# Patient Record
Sex: FEMALE | Race: OTHER | HISPANIC OR LATINO
[De-identification: names, ages, dates, MRNs, and addresses within clinical notes are randomized per-mention and may not be internally consistent; named-entity substitution may affect disease eponyms.]

---

## 2017-05-10 ENCOUNTER — TRANSCRIPTION ENCOUNTER (OUTPATIENT)
Age: 72
End: 2017-05-10

## 2017-05-18 ENCOUNTER — TRANSCRIPTION ENCOUNTER (OUTPATIENT)
Age: 72
End: 2017-05-18

## 2018-08-14 ENCOUNTER — TRANSCRIPTION ENCOUNTER (OUTPATIENT)
Age: 73
End: 2018-08-14

## 2019-01-20 ENCOUNTER — TRANSCRIPTION ENCOUNTER (OUTPATIENT)
Age: 74
End: 2019-01-20

## 2022-11-04 ENCOUNTER — EMERGENCY (EMERGENCY)
Facility: HOSPITAL | Age: 77
LOS: 0 days | Discharge: HOME | End: 2022-11-04
Attending: EMERGENCY MEDICINE
Payer: MEDICAID

## 2022-11-04 VITALS
SYSTOLIC BLOOD PRESSURE: 155 MMHG | OXYGEN SATURATION: 97 % | TEMPERATURE: 98 F | HEART RATE: 71 BPM | RESPIRATION RATE: 16 BRPM | DIASTOLIC BLOOD PRESSURE: 79 MMHG

## 2022-11-04 VITALS — HEIGHT: 60 IN | WEIGHT: 145.95 LBS

## 2022-11-04 DIAGNOSIS — S32.029A UNSPECIFIED FRACTURE OF SECOND LUMBAR VERTEBRA, INITIAL ENCOUNTER FOR CLOSED FRACTURE: ICD-10-CM

## 2022-11-04 DIAGNOSIS — I10 ESSENTIAL (PRIMARY) HYPERTENSION: ICD-10-CM
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
## 2022-11-04 DIAGNOSIS — E03.9 HYPOTHYROIDISM, UNSPECIFIED: ICD-10-CM

## 2022-11-04 DIAGNOSIS — M51.26 OTHER INTERVERTEBRAL DISC DISPLACEMENT, LUMBAR REGION: ICD-10-CM

## 2022-11-04 DIAGNOSIS — E78.5 HYPERLIPIDEMIA, UNSPECIFIED: ICD-10-CM

## 2022-11-04 DIAGNOSIS — R26.2 DIFFICULTY IN WALKING, NOT ELSEWHERE CLASSIFIED: ICD-10-CM
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
## 2022-11-04 DIAGNOSIS — M54.50 LOW BACK PAIN, UNSPECIFIED: ICD-10-CM

## 2022-11-04 LAB
APPEARANCE UR: CLEAR — SIGNIFICANT CHANGE UP
BASOPHILS # BLD AUTO: 0.07 K/UL — SIGNIFICANT CHANGE UP (ref 0–0.2)
BASOPHILS NFR BLD AUTO: 1.3 % — SIGNIFICANT CHANGE UP (ref 0–2)
BILIRUB UR-MCNC: NEGATIVE — SIGNIFICANT CHANGE UP
COLOR SPEC: YELLOW — SIGNIFICANT CHANGE UP
DIFF PNL FLD: NEGATIVE — SIGNIFICANT CHANGE UP
EOSINOPHIL # BLD AUTO: 0.15 K/UL — SIGNIFICANT CHANGE UP (ref 0–0.5)
EOSINOPHIL NFR BLD AUTO: 2.8 % — SIGNIFICANT CHANGE UP (ref 0–6)
GLUCOSE UR QL: NEGATIVE — SIGNIFICANT CHANGE UP
HCT VFR BLD CALC: 42.8 % — SIGNIFICANT CHANGE UP (ref 34.5–45)
HGB BLD-MCNC: 13.8 G/DL — SIGNIFICANT CHANGE UP (ref 11.5–15.5)
IMM GRANULOCYTES NFR BLD AUTO: 0.2 % — SIGNIFICANT CHANGE UP (ref 0–0.9)
KETONES UR-MCNC: NEGATIVE — SIGNIFICANT CHANGE UP
LEUKOCYTE ESTERASE UR-ACNC: ABNORMAL
LYMPHOCYTES # BLD AUTO: 1.46 K/UL — SIGNIFICANT CHANGE UP (ref 1–3.3)
LYMPHOCYTES # BLD AUTO: 27.2 % — SIGNIFICANT CHANGE UP (ref 13–44)
MCHC RBC-ENTMCNC: 28.5 PG — SIGNIFICANT CHANGE UP (ref 27–34)
MCHC RBC-ENTMCNC: 32.2 GM/DL — SIGNIFICANT CHANGE UP (ref 32–36)
MCV RBC AUTO: 88.4 FL — SIGNIFICANT CHANGE UP (ref 80–100)
MONOCYTES # BLD AUTO: 0.6 K/UL — SIGNIFICANT CHANGE UP (ref 0–0.9)
MONOCYTES NFR BLD AUTO: 11.2 % — SIGNIFICANT CHANGE UP (ref 2–14)
NEUTROPHILS # BLD AUTO: 3.08 K/UL — SIGNIFICANT CHANGE UP (ref 1.8–7.4)
NEUTROPHILS NFR BLD AUTO: 57.3 % — SIGNIFICANT CHANGE UP (ref 43–77)
NITRITE UR-MCNC: NEGATIVE — SIGNIFICANT CHANGE UP
PH UR: 6 — SIGNIFICANT CHANGE UP (ref 5–8)
PLATELET # BLD AUTO: 251 K/UL — SIGNIFICANT CHANGE UP (ref 150–400)
PROT UR-MCNC: NEGATIVE — SIGNIFICANT CHANGE UP
RBC # BLD: 4.84 M/UL — SIGNIFICANT CHANGE UP (ref 3.8–5.2)
RBC # FLD: 12.8 % — SIGNIFICANT CHANGE UP (ref 10.3–14.5)
SP GR SPEC: 1.01 — SIGNIFICANT CHANGE UP (ref 1.01–1.02)
UROBILINOGEN FLD QL: NEGATIVE — SIGNIFICANT CHANGE UP
WBC # BLD: 5.37 K/UL — SIGNIFICANT CHANGE UP (ref 3.8–10.5)
WBC # FLD AUTO: 5.37 K/UL — SIGNIFICANT CHANGE UP (ref 3.8–10.5)

## 2022-11-04 PROCEDURE — 96374 THER/PROPH/DIAG INJ IV PUSH: CPT

## 2022-11-04 PROCEDURE — 36415 COLL VENOUS BLD VENIPUNCTURE: CPT

## 2022-11-04 PROCEDURE — 87086 URINE CULTURE/COLONY COUNT: CPT

## 2022-11-04 PROCEDURE — 96375 TX/PRO/DX INJ NEW DRUG ADDON: CPT

## 2022-11-04 PROCEDURE — 72131 CT LUMBAR SPINE W/O DYE: CPT | Mod: MA

## 2022-11-04 PROCEDURE — 72131 CT LUMBAR SPINE W/O DYE: CPT | Mod: 26,MA

## 2022-11-04 PROCEDURE — 81001 URINALYSIS AUTO W/SCOPE: CPT

## 2022-11-04 PROCEDURE — 99284 EMERGENCY DEPT VISIT MOD MDM: CPT | Mod: 25

## 2022-11-04 PROCEDURE — 85025 COMPLETE CBC W/AUTO DIFF WBC: CPT

## 2022-11-04 PROCEDURE — 99285 EMERGENCY DEPT VISIT HI MDM: CPT

## 2022-11-04 RX ORDER — CYCLOBENZAPRINE HYDROCHLORIDE 10 MG/1
10 TABLET, FILM COATED ORAL ONCE
Refills: 0 | Status: COMPLETED | OUTPATIENT
Start: 2022-11-04 | End: 2022-11-04

## 2022-11-04 RX ORDER — ACETAMINOPHEN 500 MG
1000 TABLET ORAL ONCE
Refills: 0 | Status: COMPLETED | OUTPATIENT
Start: 2022-11-04 | End: 2022-11-04

## 2022-11-04 RX ORDER — ONDANSETRON 8 MG/1
4 TABLET, FILM COATED ORAL ONCE
Refills: 0 | Status: COMPLETED | OUTPATIENT
Start: 2022-11-04 | End: 2022-11-04

## 2022-11-04 RX ORDER — OXYCODONE HYDROCHLORIDE 5 MG/1
1 TABLET ORAL
Qty: 8 | Refills: 0
Start: 2022-11-04 | End: 2022-11-07

## 2022-11-04 RX ORDER — SODIUM CHLORIDE 9 MG/ML
1000 INJECTION INTRAMUSCULAR; INTRAVENOUS; SUBCUTANEOUS ONCE
Refills: 0 | Status: COMPLETED | OUTPATIENT
Start: 2022-11-04 | End: 2022-11-04

## 2022-11-04 RX ORDER — MORPHINE SULFATE 50 MG/1
4 CAPSULE, EXTENDED RELEASE ORAL ONCE
Refills: 0 | Status: DISCONTINUED | OUTPATIENT
Start: 2022-11-04 | End: 2022-11-04

## 2022-11-04 RX ADMIN — MORPHINE SULFATE 4 MILLIGRAM(S): 50 CAPSULE, EXTENDED RELEASE ORAL at 20:01

## 2022-11-04 RX ADMIN — ONDANSETRON 4 MILLIGRAM(S): 8 TABLET, FILM COATED ORAL at 20:01

## 2022-11-04 RX ADMIN — Medication 400 MILLIGRAM(S): at 20:01

## 2022-11-04 RX ADMIN — CYCLOBENZAPRINE HYDROCHLORIDE 10 MILLIGRAM(S): 10 TABLET, FILM COATED ORAL at 20:01

## 2022-11-04 RX ADMIN — SODIUM CHLORIDE 1000 MILLILITER(S): 9 INJECTION INTRAMUSCULAR; INTRAVENOUS; SUBCUTANEOUS at 20:00

## 2022-11-04 NOTE — ED STATDOCS - NSFOLLOWUPINSTRUCTIONS_ED_ALL_ED_FT
Follow up with the spine doctor monday or tuesday for further evaluation. Take the prescribed medication as directed. Make sure someone is with her when taking the oxycodone. You can use miralax or colace to prevent constipation from the oxycodone.   Rest your back and use ice over the area of pain.     Return to the ER for any new or other concerns.        Lumbar Spine Fracture       A lumbar spine fracture is a break in one of the bones of the lower back. Lumbar spine fractures can vary from mild to severe. The most severe types are those that:  •Cause the broken bones to move out of place (unstable).      •Injure or press on the spinal cord.      During recovery, it is normal to have pain and stiffness in the lower back for weeks.      What are the causes?    This condition may be caused by:  •A fall.      •A car accident.      •A gunshot wound.      •A hard, direct hit to the back.        What increases the risk?    You are more likely to develop this condition if:  •You are in a situation that could result in a fall or other violent injury.      •You have a condition that causes weakness in the bones (osteoporosis).        What are the signs or symptoms?    The main symptom of this condition is severe pain in the lower back. If a fracture is complex or severe, there may also be:  •A misshapen or swollen area on the lower back.      •Limited ability to move an area of the lower back.      •Inability to empty the bladder (urinary retention).      •Loss of bowel or bladder control (incontinence).      •Loss of strength or sensation in the legs, feet, and toes.      •Inability to move (paralysis).        How is this diagnosed?    This condition is diagnosed based on:  •A physical exam.      •Symptoms and what happened just before they developed.      •The results of imaging tests, such as an X-ray, CT scan, or MRI.      If your nerves have been damaged, you may also have other tests to find out the extent of the damage.      How is this treated?    Treatment for this condition depends on how severe the injury is. Most fractures can be treated with:  •A back brace.      •Bed rest and activity restrictions.      •Pain medicine.      •Physical therapy.      Fractures that are complex, involve multiple bones, or make the spine unstable may require surgery. Surgery is done:  •To remove pressure from the nerves or spinal cord.      •To stabilize the broken pieces of bone.        Follow these instructions at home:    Medicines     •Take over-the-counter and prescription medicines only as told by your health care provider.      • Do not drive or use heavy machinery while taking prescription pain medicine.    •If you are taking prescription pain medicine, take actions to prevent or treat constipation. Your health care provider may recommend that you:   •Drink enough fluid to keep your urine pale yellow.       •Eat foods that are high in fiber, such as fresh fruits and vegetables, whole grains, and beans.       •Limit foods that are high in fat and processed sugars, such as fried or sweet foods.       •Take an over-the-counter or prescription medicine for constipation.        If you have a brace:     •Wear the back brace as told by your health care provider. Remove it only as told by your health care provider.      •Keep the brace clean.    •If the brace is not waterproof:  •Do not let it get wet.      •Cover it with a watertight covering when you take a bath or a shower.        Activity     •Stay in bed (on bed rest) only as directed by your health care provider.      •Do exercises to improve motion and strength in your back (physical therapy), if your health care provider tells you to do so.      •Return to your normal activities as directed by your health care provider. Ask your health care provider what activities are safe for you.        Managing pain, stiffness, and swelling    •If directed, put ice on the injured area:  •If you have a removable brace, remove it as told by your health care provider.      •Put ice in a plastic bag.      •Place a towel between your skin and the bag.      •Leave the ice on for 20 minutes, 2–3 times a day.        General instructions     • Do not use any products that contain nicotine or tobacco, such as cigarettes and e-cigarettes. These can delay healing after injury. If you need help quitting, ask your health care provider.      • Do not drink alcohol. Alcohol can interfere with your treatment.    •Keep all follow-up visits as directed by your health care provider. This is important.   •Failing to follow up as recommended could result in permanent injury, disability, or long-lasting (chronic) pain.          Contact a health care provider if:    •You have a fever.      •Your pain medicine is not helping.      •Your pain does not get better over time.      •You cannot return to your normal activities as planned or expected.        Get help right away if:    •You have difficulty breathing.      •Your pain is very bad and it suddenly gets worse.      •You have numbness, tingling, or weakness in any part of your body.      •You are unable to empty your bladder.      •You cannot control your bladder or bowels.      •You are unable to move any body part (paralysis) that is below the level of your injury.      •You vomit.      •You have pain in your abdomen.        Summary    •A lumbar spine fracture is a break in one of the bones of the lower back.      •The main symptom of this condition is severe pain in the lower back. If a fracture is complex, there may also be numbness, tingling, or paralysis in the legs.      •Treatment depends on how severe the injury is. Most fractures can be treated with a back brace, bed rest and activity restrictions, pain medicine, and physical therapy.      •Fractures that are complex, involve multiple bones, or make the spine unstable may require surgery.

## 2022-11-04 NOTE — ED STATDOCS - CARE PLAN
1 Principal Discharge DX:	L2 vertebral fracture  Secondary Diagnosis:	Lumbar herniated disc  Secondary Diagnosis:	Back pain

## 2022-11-04 NOTE — ED STATDOCS - OBJECTIVE STATEMENT
76 yo F with PMHx of HLD, HTN and hypothyroid, not on OAC w/granddaughter and , for low back pain for 3 days. worsening despite tylenol. has had similar in the past but now goes across her back and she is having trouble walking. No bowel or bladder incontinence. No trauma. No numbness or tingling.  No weakness. Surg HX : hysterectomy. denies urine sxs no fever no abd pain. no n/v/d.

## 2022-11-04 NOTE — ED STATDOCS - PHYSICAL EXAMINATION
Constitutional: NAD AOx3  Eyes: PERRL EOMI  Head: Normocephalic atraumatic  Mouth: MMM  Cardiac: regular rate and rhythm  Resp: Lungs CTAB  GI: Abd s/nd/nt  Neuro: CN2-12 grossly intact, PÉREZ x 4  Skin: No visible rashes   lumbar spine: no midline ttp,  ttp bilateral lumbar paraspinals, distal NVI

## 2022-11-04 NOTE — ED STATDOCS - CARE PROVIDER_API CALL
Yoel Potts)  Orthopaedic Surgery  [address] [place] [address]  Phone: [phone]  Fax: [phone]  Follow Up Time:

## 2022-11-04 NOTE — ED ADULT TRIAGE NOTE - CHIEF COMPLAINT QUOTE
PT A/OX3, reports back pain for 2 days. pt reports "taking Tylenol with no relief last dose 0800am." pt denies lifting heavy objects, injury and trauma. denies numbness/tingling/weakness.

## 2022-11-04 NOTE — ED STATDOCS - PATIENT PORTAL LINK FT
You can access the FollowMyHealth Patient Portal offered by [facility] by registering at the following website: [email or web address]. By joining [employer]’s FollowMyHealth portal, you will also be able to view your health information using other applications (apps) compatible with our system.

## 2022-11-04 NOTE — ED STATDOCS - NS ED ATTENDING STATEMENT MOD
This was a shared visit with the [facility]. I reviewed and verified the documentation and independently performed the documented:

## 2022-11-04 NOTE — ED STATDOCS - PROGRESS NOTE DETAILS
76 yo female with a PMH of htn, hld, and hypothyroid presents with lower back pain x 3 days. Pt first felt it on the R side but now feels it on both side and pain is causing her to have trouble walking. Pain worse with sitting because it places pressure on the back. Denies abd pain, fever, incontinence of urine or stool, dysuria, hematuria. Will check labs, UA, and CT and reeval. -Arvin Pollock PA-C CT results discussed with Dr. Kevin (spine). States pt can go home with pain meds and medrol dose pack and f/u in the office mon/tues where they can give her a brace. Pt and granddaughter were made aware and agrees with plan. Pt states her pain has improved and has been able to ambulate in the ER. Advised daughter that the oxycodone we give her, someone should stay with her and be with her while she walks to make sure she doesn't fall. Granddaughter aware and agrees with plan. -Arvin Pollock PA-C

## 2022-11-04 NOTE — ED ADULT NURSE NOTE - MODE OF DISCHARGE
I have personally performed a face to face diagnostic evaluation on this patient. I have reviewed the ACP note and agree with the history, exam and plan of care, except as noted.
I have personally performed a face to face diagnostic evaluation on this patient. I have reviewed the ACP note and agree with the history, exam and plan of care, except as noted.
Wheelchair
negative...

## 2022-11-06 LAB
CULTURE RESULTS: SIGNIFICANT CHANGE UP
SPECIMEN SOURCE: SIGNIFICANT CHANGE UP

## 2023-06-26 ENCOUNTER — NON-APPOINTMENT (OUTPATIENT)
Age: 78
End: 2023-06-26

## 2023-06-27 ENCOUNTER — EMERGENCY (EMERGENCY)
Facility: HOSPITAL | Age: 78
LOS: 1 days | Discharge: HOME | End: 2023-06-27
Attending: STUDENT IN AN ORGANIZED HEALTH CARE EDUCATION/TRAINING PROGRAM
Payer: MEDICAID

## 2023-06-27 VITALS
TEMPERATURE: 98 F | WEIGHT: 145.28 LBS | HEART RATE: 64 BPM | SYSTOLIC BLOOD PRESSURE: 157 MMHG | HEIGHT: 64 IN | DIASTOLIC BLOOD PRESSURE: 72 MMHG | OXYGEN SATURATION: 96 % | RESPIRATION RATE: 20 BRPM

## 2023-06-27 PROCEDURE — 99284 EMERGENCY DEPT VISIT MOD MDM: CPT

## 2023-06-27 NOTE — ED ADULT TRIAGE NOTE - PAIN: PRESENCE, MLM
complains of pain/discomfort
DISPLAY PLAN FREE TEXT

## 2023-06-27 NOTE — ED ADULT TRIAGE NOTE - CHIEF COMPLAINT QUOTE
Patient sent to ED from urgent care for evaluation of right knee pain and pain behind right knee times one week.  Right leg noted to be warm to touch and swollen.  Denies trauma/injury.

## 2023-06-28 PROCEDURE — 99284 EMERGENCY DEPT VISIT MOD MDM: CPT

## 2023-06-28 PROCEDURE — 73562 X-RAY EXAM OF KNEE 3: CPT

## 2023-06-28 PROCEDURE — 93971 EXTREMITY STUDY: CPT | Mod: 26,RT

## 2023-06-28 PROCEDURE — 73562 X-RAY EXAM OF KNEE 3: CPT | Mod: 26,RT

## 2023-06-28 PROCEDURE — 93971 EXTREMITY STUDY: CPT

## 2023-06-28 RX ORDER — IBUPROFEN 200 MG
600 TABLET ORAL ONCE
Refills: 0 | Status: COMPLETED | OUTPATIENT
Start: 2023-06-28 | End: 2023-06-28

## 2023-06-28 RX ORDER — ACETAMINOPHEN 500 MG
650 TABLET ORAL ONCE
Refills: 0 | Status: COMPLETED | OUTPATIENT
Start: 2023-06-28 | End: 2023-06-28

## 2023-06-28 RX ADMIN — Medication 650 MILLIGRAM(S): at 00:29

## 2023-06-28 RX ADMIN — Medication 600 MILLIGRAM(S): at 00:29

## 2023-06-28 NOTE — ED PROVIDER NOTE - CLINICAL SUMMARY MEDICAL DECISION MAKING FREE TEXT BOX
78F with 1 week knee pain.   mild swelling, ttp posterior knee. No erythema or warmth.   XR, US duplex 78F with 1 week knee pain.   mild swelling, ttp posterior knee. No erythema or warmth.   XR, US duplex without acute findings. pain improved after meds. Discussed ortho FU and given return precautions.

## 2023-06-28 NOTE — ED PROVIDER NOTE - PHYSICAL EXAMINATION
Gen: No acute distress, non toxic  Neuro: A&O x 3, moving all 4 extremities.   MSK: right knee minimal swelling. TTP posterior to knee. no erythema or warmth. ligaments intact. able to straight leg raise.   Skin: No rashes. intact and perfused.

## 2023-06-28 NOTE — ED PROVIDER NOTE - PATIENT PORTAL LINK FT
You can access the FollowMyHealth Patient Portal offered by [facility] by registering at the following website: [email or web address]. By joining [employer]’s FollowMyHealth portal, you will also be able to view your health information using other applications (apps) compatible with our system.

## 2023-06-28 NOTE — ED PROVIDER NOTE - ATTENDING APP SHARED VISIT CONTRIBUTION OF CARE
Pt with 1 wk of atraumatic R knee pain. pt went to  and was sent to the ER. no swelling. no other complaints. will check XR and US.

## 2023-06-28 NOTE — ED ADULT NURSE NOTE - OBJECTIVE STATEMENT
PT reports to ED with non traumatic knee pain starting 1 week prior. PT was seen at urgent care and referred to ED to R/O Thrombus, PT states pain is located behind knee and portion of calf

## 2023-10-11 ENCOUNTER — NON-APPOINTMENT (OUTPATIENT)
Age: 78
End: 2023-10-11

## 2023-10-16 ENCOUNTER — EMERGENCY (EMERGENCY)
Facility: HOSPITAL | Age: 78
LOS: 1 days | Discharge: HOME | End: 2023-10-16
Attending: EMERGENCY MEDICINE
Payer: MEDICAID

## 2023-10-16 VITALS
SYSTOLIC BLOOD PRESSURE: 151 MMHG | DIASTOLIC BLOOD PRESSURE: 79 MMHG | RESPIRATION RATE: 14 BRPM | TEMPERATURE: 98 F | WEIGHT: 141.76 LBS | OXYGEN SATURATION: 99 % | HEART RATE: 90 BPM

## 2023-10-16 PROCEDURE — 99283 EMERGENCY DEPT VISIT LOW MDM: CPT

## 2023-10-16 PROCEDURE — 99284 EMERGENCY DEPT VISIT MOD MDM: CPT

## 2023-10-16 PROCEDURE — T1013: CPT

## 2023-10-16 RX ORDER — ALBUTEROL 90 UG/1
1 AEROSOL, METERED ORAL ONCE
Refills: 0 | Status: DISCONTINUED | OUTPATIENT
Start: 2023-10-16 | End: 2023-10-23

## 2023-10-16 RX ORDER — AZITHROMYCIN 500 MG/1
1 TABLET, FILM COATED ORAL
Qty: 1 | Refills: 0
Start: 2023-10-16 | End: 2023-10-20

## 2023-10-16 NOTE — ED STATDOCS - NSFOLLOWUPINSTRUCTIONS_ED_ALL_ED_FT
use medications as prescribed.  Use albuterol ihaler with aerochamber every 4-6 hours as needed for cough or shortness of breath.  Return immediately to the ER for re-evaluation if your symptoms recur or worsening. Otherwise, follow-up with PMD or pulmonology in 1-2 weeks for reassessment: call today to arrange an appointment

## 2023-10-16 NOTE — ED STATDOCS - NS_ATTENDINGSCRIBE_ED_ALL_ED
Vaccine Information Statement(s) was given today. This has been reviewed, questions answered, and verbal consent given by Parent for injection(s) and administration of Multi Vaccines between birth and 6 months.     I personally performed the service described in the documentation recorded by the scribe in my presence, and it accurately and completely records my words and actions.

## 2023-10-16 NOTE — ED STATDOCS - RESPIRATORY, MLM
inspiratory and expiratory wheezing, scatter rhonchi, no rales. end expiratory wheezing b/l, scattered rhonchi b/l lower, no rales.

## 2023-10-16 NOTE — ED ADULT TRIAGE NOTE - CHIEF COMPLAINT QUOTE
Pt presents to ED c/o cough x3 weeks. C/o left sided rib pain. Patient was diagnosed with bronchitis at urgent care but has not improved.

## 2023-10-16 NOTE — ED STATDOCS - PATIENT PORTAL LINK FT
You can access the FollowMyHealth Patient Portal offered by [facility] by registering at the following website: [email or web address]. By joining [employer]’s FollowMyHealth portal, you will also be able to view your health information using other applications (apps) compatible with our system.

## 2023-10-16 NOTE — ED STATDOCS - OBJECTIVE STATEMENT
79 y/o female with pmhx of HTN, HLD and Hypothyroid, c/o dry coughing x3 weeks. Pt went to  on Friday, and was dx with Bronchitis. The pt was sent to f/u with pulmonologist but hasn't been able to schedule. Pt now has rib pain from coughing. Pt was given albuterol inhaler by , uses every 3 hrs w/o relief. No fever. No runny nose. +sore throat.  Pt had a negative covid test done. PMD is Dr. Antonio. No smoking hx.    Annia Braun 79 y/o female with pmhx of HTN, HLD and Hypothyroid, c/o dry coughing x3 weeks. Pt went to  on Friday, and was dx with Bronchitis. The pt was sent to f/u with pulmonologist but hasn't been able to schedule. Pt now has rib pain from coughing. Pt was given albuterol inhaler by , uses every 3 hrs w/o relief. No fever. No runny nose. +sore throat.  Pt had a negative covid test and negative CXR (results available in [facility]). PMD is Dr. Antonio. No smoking hx.    Annia Braun

## 2023-10-16 NOTE — ED STATDOCS - NSICDXPASTMEDICALHX_GEN_ALL_CORE_FT
3-point gait
PAST MEDICAL HISTORY:  HLD (hyperlipidemia)     HTN (hypertension)     Hypothyroidism

## 2023-10-17 PROBLEM — E78.5 HYPERLIPIDEMIA, UNSPECIFIED: Chronic | Status: ACTIVE | Noted: 2023-10-16

## 2023-10-17 PROBLEM — E03.9 HYPOTHYROIDISM, UNSPECIFIED: Chronic | Status: ACTIVE | Noted: 2023-10-16

## 2023-10-17 PROBLEM — I10 ESSENTIAL (PRIMARY) HYPERTENSION: Chronic | Status: ACTIVE | Noted: 2023-10-16

## 2023-10-18 PROBLEM — Z00.00 ENCOUNTER FOR PREVENTIVE HEALTH EXAMINATION: Status: ACTIVE | Noted: 2023-10-18

## 2023-11-03 ENCOUNTER — APPOINTMENT (OUTPATIENT)
Dept: PULMONOLOGY | Facility: CLINIC | Age: 78
End: 2023-11-03

## 2023-11-06 NOTE — ED PROVIDER NOTE - CARE PROVIDER_API CALL
Refer to the Assessment tab to view/cancel completed assessment.
Brayan Gaspar  Orthopaedic Surgery  [address] [place] [id number]  Phone: [phone]  Fax: [phone]  Follow Up Time:

## 2023-11-09 ENCOUNTER — APPOINTMENT (OUTPATIENT)
Dept: INTERNAL MEDICINE | Facility: CLINIC | Age: 78
End: 2023-11-09

## 2024-06-28 ENCOUNTER — EMERGENCY (EMERGENCY)
Facility: HOSPITAL | Age: 79
LOS: 1 days | Discharge: HOME | End: 2024-06-28
Attending: EMERGENCY MEDICINE
Payer: MEDICAID

## 2024-06-28 VITALS
OXYGEN SATURATION: 99 % | TEMPERATURE: 98 F | HEART RATE: 70 BPM | DIASTOLIC BLOOD PRESSURE: 95 MMHG | RESPIRATION RATE: 16 BRPM | SYSTOLIC BLOOD PRESSURE: 165 MMHG

## 2024-06-28 VITALS
OXYGEN SATURATION: 98 % | TEMPERATURE: 98 F | HEART RATE: 76 BPM | WEIGHT: 145.95 LBS | DIASTOLIC BLOOD PRESSURE: 100 MMHG | SYSTOLIC BLOOD PRESSURE: 175 MMHG | RESPIRATION RATE: 18 BRPM

## 2024-06-28 PROCEDURE — 99283 EMERGENCY DEPT VISIT LOW MDM: CPT

## 2024-06-28 PROCEDURE — 99284 EMERGENCY DEPT VISIT MOD MDM: CPT

## 2024-06-28 PROCEDURE — 96372 THER/PROPH/DIAG INJ SC/IM: CPT

## 2024-06-28 RX ORDER — ACETAMINOPHEN 325 MG
650 TABLET ORAL ONCE
Refills: 0 | Status: COMPLETED | OUTPATIENT
Start: 2024-06-28 | End: 2024-06-28

## 2024-06-28 RX ORDER — OXYCODONE HYDROCHLORIDE 100 MG/5ML
1 SOLUTION ORAL
Qty: 12 | Refills: 0
Start: 2024-06-28 | End: 2024-06-30

## 2024-06-28 RX ORDER — DEXAMETHASONE 1 MG/1
8 TABLET ORAL ONCE
Refills: 0 | Status: COMPLETED | OUTPATIENT
Start: 2024-06-28 | End: 2024-06-28

## 2024-06-28 RX ADMIN — Medication 650 MILLIGRAM(S): at 13:50

## 2024-06-28 RX ADMIN — DEXAMETHASONE 8 MILLIGRAM(S): 1 TABLET ORAL at 13:50

## 2024-09-16 ENCOUNTER — EMERGENCY (EMERGENCY)
Facility: HOSPITAL | Age: 79
LOS: 1 days | Discharge: HOME | End: 2024-09-16
Attending: INTERNAL MEDICINE | Admitting: INTERNAL MEDICINE
Payer: MEDICAID

## 2024-09-16 VITALS
SYSTOLIC BLOOD PRESSURE: 210 MMHG | RESPIRATION RATE: 18 BRPM | HEART RATE: 77 BPM | DIASTOLIC BLOOD PRESSURE: 88 MMHG | HEIGHT: 60 IN | TEMPERATURE: 98 F | OXYGEN SATURATION: 96 % | WEIGHT: 143.08 LBS

## 2024-09-16 PROCEDURE — 70450 CT HEAD/BRAIN W/O DYE: CPT | Mod: 26,MC

## 2024-09-16 PROCEDURE — 70450 CT HEAD/BRAIN W/O DYE: CPT | Mod: MC

## 2024-09-16 PROCEDURE — 99284 EMERGENCY DEPT VISIT MOD MDM: CPT | Mod: 25

## 2024-09-16 PROCEDURE — 99284 EMERGENCY DEPT VISIT MOD MDM: CPT

## 2024-09-16 RX ORDER — CLONIDINE HCL 0.2 MG
0.2 TABLET ORAL ONCE
Refills: 0 | Status: COMPLETED | OUTPATIENT
Start: 2024-09-16 | End: 2024-09-16

## 2024-09-16 RX ORDER — ACETAMINOPHEN 325 MG/1
650 TABLET ORAL ONCE
Refills: 0 | Status: COMPLETED | OUTPATIENT
Start: 2024-09-16 | End: 2024-09-16

## 2024-09-16 RX ADMIN — ACETAMINOPHEN 650 MILLIGRAM(S): 325 TABLET ORAL at 23:38

## 2024-09-16 RX ADMIN — ACETAMINOPHEN 650 MILLIGRAM(S): 325 TABLET ORAL at 23:42

## 2024-09-16 RX ADMIN — Medication 0.2 MILLIGRAM(S): at 22:21

## 2024-09-16 NOTE — ED PROVIDER NOTE - SIGNIFICANT NEGATIVE FINDINGS
no fever no rash no toxemia , no chest pain, no SOB, no palpitations, no n/v/d, no urinary symptoms, no bleeding. no neuro changes.

## 2024-09-16 NOTE — ED PROVIDER NOTE - CARE PROVIDER_API CALL
Rossi Lion  Cardiology  [address] [place] [address]  Phone: [phone]  Fax: [phone]  Established Patient  Follow Up Time:     Sebas Campbell  Neurology  [address] [place] [id number]  Phone: [phone]  Fax: [phone]  Established Patient  Follow Up Time:

## 2024-09-16 NOTE — ED PROVIDER NOTE - OBJECTIVE STATEMENT
Pt c/o headache started 1 week ago on top of head, 2 days ago moved ro Rt side of head, today woke up with bruising to Rt eye, denies any trauma, Hx HTN, lalo blood thinners, sent from urgent care for CT scan.  headache, Rt eye ecchymosis 80 y/o female H/O HTN Pt c/o headache started 1 week,  bruising to Rt medial yanick orbital region , denies any trauma, no vision change ,  blood thinners, sent from urgent care for CT scan. no fever no rash no toxemia , no chest pain, no SOB, no palpitations, no n/v/d, no urinary symptoms, no bleeding. no neuro changes.

## 2024-09-16 NOTE — ED PROVIDER NOTE - NSFOLLOWUPINSTRUCTIONS_ED_ALL_ED_FT
Headache    A headache is pain or discomfort felt around the head or neck area. The specific cause of a headache may not be found as there are many types including tension headaches, migraine headaches, and cluster headaches. Watch your condition for any changes. Things you can do to manage your pain include taking over the counter and prescription medications as instructed by your health care provider, lying down in a dark quiet room, limiting stress, getting regular sleep, and refraining from alcohol and tobacco products.    SEEK IMMEDIATE MEDICAL CARE IF YOU HAVE ANY OF THE FOLLOWING SYMPTOMS: fever, vomiting, stiff neck, loss of vision, problems with speech, muscle weakness, loss of balance, trouble walking, passing out, or confusion.        Hypertension, commonly called high blood pressure, is when the force of blood pumping through your arteries is too strong. Hypertension forces your heart to work harder to pump blood. Your arteries may become narrow or stiff. Having untreated or uncontrolled hypertension for a long period of time can cause heart attack, stroke, kidney disease, and other problems. If started on a medication, take exactly as prescribed by your health care professional. Maintain a healthy lifestyle and follow up with your primary care physician.    SEEK IMMEDIATE MEDICAL CARE IF YOU HAVE ANY OF THE FOLLOWING SYMPTOMS: severe headache, confusion, chest pain, abdominal pain, vomiting, or shortness of breath.

## 2024-09-16 NOTE — ED PROVIDER NOTE - CLINICAL SUMMARY MEDICAL DECISION MAKING FREE TEXT BOX
80 y/o female H/O HTN Pt c/o headache started 1 week,  bruising to Rt medial yanick orbital region , denies any trauma, no vision change ,  blood thinners, sent from urgent care for CT scan. no fever no rash no toxemia , no chest pain, no SOB, no palpitations, no n/v/d, no urinary symptoms, no bleeding. no neuro changes. VSS Exam stable, CT head normal HTN treated with catapres

## 2024-09-16 NOTE — ED PROVIDER NOTE - PATIENT PORTAL LINK FT
You can access the FollowMyHealth Patient Portal offered by [facility] by registering at the following website: [email or web address]. By joining [employer]’s FollowMyHealth portal, you will also be able to view your health information using other applications (apps) compatible with our system.

## 2024-09-16 NOTE — ED ADULT NURSE NOTE - OBJECTIVE STATEMENT
Patient came in accompanied by daughter who reports patient has been c/o headache for the past 1 week on and off and shifted to the right side and today patient developed bruising on the right side of the right eye. Patient denies nausea/ vomiting/ no weakness/ slurring of speech. Patient noted with high BP in triage @ 210/88.

## 2024-09-16 NOTE — ED ADULT TRIAGE NOTE - CHIEF COMPLAINT QUOTE
Pt c/o headache started 1 week ago on top of head, 2 days ago moved ro Rt side of head, today woke up with bruising to Rt eye, denies any trauma, Hx HTN, lalo blood thinners, sent from urgent care for CT scan.

## 2024-09-16 NOTE — ED PROVIDER NOTE - PROVIDER TOKENS
PROVIDER:[TOKEN:[[id number]],ESTABLISHEDPATIENT:[T]],PROVIDER:[TOKEN:[[id number]],ESTABLISHEDPATIENT:[T]]